# Patient Record
Sex: MALE | Race: WHITE | Employment: FULL TIME | URBAN - METROPOLITAN AREA
[De-identification: names, ages, dates, MRNs, and addresses within clinical notes are randomized per-mention and may not be internally consistent; named-entity substitution may affect disease eponyms.]

---

## 2018-05-23 ENCOUNTER — OFFICE VISIT (OUTPATIENT)
Dept: NEUROLOGY | Age: 58
End: 2018-05-23
Payer: COMMERCIAL

## 2018-05-23 VITALS
HEART RATE: 63 BPM | OXYGEN SATURATION: 96 % | HEIGHT: 71 IN | RESPIRATION RATE: 18 BRPM | DIASTOLIC BLOOD PRESSURE: 71 MMHG | SYSTOLIC BLOOD PRESSURE: 110 MMHG | BODY MASS INDEX: 25.2 KG/M2 | WEIGHT: 180 LBS

## 2018-05-23 DIAGNOSIS — R55 PRE-SYNCOPE: Primary | ICD-10-CM

## 2018-05-23 PROCEDURE — 99214 OFFICE O/P EST MOD 30 MIN: CPT | Performed by: NURSE PRACTITIONER

## 2020-02-06 ENCOUNTER — HOSPITAL ENCOUNTER (OUTPATIENT)
Age: 60
Discharge: HOME | End: 2020-02-08

## 2020-02-06 PROCEDURE — 88305 TISSUE EXAM BY PATHOLOGIST: CPT

## 2020-09-09 ENCOUNTER — HOSPITAL ENCOUNTER (EMERGENCY)
Age: 60
Discharge: HOME | End: 2020-09-09
Attending: EMERGENCY MEDICINE
Payer: COMMERCIAL

## 2020-09-09 VITALS
TEMPERATURE: 97.8 F | WEIGHT: 180 LBS | OXYGEN SATURATION: 98 % | SYSTOLIC BLOOD PRESSURE: 128 MMHG | HEART RATE: 83 BPM | DIASTOLIC BLOOD PRESSURE: 78 MMHG | HEIGHT: 71 IN | BODY MASS INDEX: 25.2 KG/M2 | RESPIRATION RATE: 18 BRPM

## 2020-09-09 PROCEDURE — 6360000002 HC RX W HCPCS: Performed by: EMERGENCY MEDICINE

## 2020-09-09 PROCEDURE — 90714 TD VACC NO PRESV 7 YRS+ IM: CPT | Performed by: EMERGENCY MEDICINE

## 2020-09-09 PROCEDURE — 90471 IMMUNIZATION ADMIN: CPT | Performed by: EMERGENCY MEDICINE

## 2020-09-09 PROCEDURE — G0381 LEV 2 HOSP TYPE B ED VISIT: HCPCS

## 2020-09-09 RX ORDER — AMOXICILLIN AND CLAVULANATE POTASSIUM 875; 125 MG/1; MG/1
1 TABLET, FILM COATED ORAL 2 TIMES DAILY
Qty: 20 TABLET | Refills: 0 | Status: SHIPPED | OUTPATIENT
Start: 2020-09-09 | End: 2020-09-19

## 2020-09-09 RX ORDER — TETANUS AND DIPHTHERIA TOXOIDS ADSORBED 2; 2 [LF]/.5ML; [LF]/.5ML
0.5 INJECTION INTRAMUSCULAR ONCE
Status: COMPLETED | OUTPATIENT
Start: 2020-09-09 | End: 2020-09-09

## 2020-09-09 RX ADMIN — TETANUS AND DIPHTHERIA TOXOIDS ADSORBED 0.5 ML: 2; 2 INJECTION INTRAMUSCULAR at 11:20

## 2020-09-09 ASSESSMENT — PAIN DESCRIPTION - ONSET: ONSET: SUDDEN

## 2020-09-09 ASSESSMENT — PAIN DESCRIPTION - PROGRESSION: CLINICAL_PROGRESSION: NOT CHANGED

## 2020-09-09 ASSESSMENT — PAIN SCALES - GENERAL: PAINLEVEL_OUTOF10: 5

## 2020-09-09 ASSESSMENT — PAIN DESCRIPTION - LOCATION: LOCATION: ELBOW;LEG

## 2020-09-09 ASSESSMENT — PAIN DESCRIPTION - DESCRIPTORS: DESCRIPTORS: ACHING;BURNING;SORE;TENDER

## 2020-09-09 ASSESSMENT — PAIN DESCRIPTION - PAIN TYPE: TYPE: ACUTE PAIN

## 2020-09-09 ASSESSMENT — PAIN DESCRIPTION - ORIENTATION: ORIENTATION: LEFT;RIGHT;LOWER

## 2020-09-09 ASSESSMENT — PAIN DESCRIPTION - FREQUENCY: FREQUENCY: CONTINUOUS

## 2020-09-09 NOTE — LETTER
[address] [place] [address] Emergency Department  [address] [address]  Phone: [phone]               September 9, 2020    Patient: Skeeter Olszewski   YOB: 1960   Date of Visit: 9/9/2020       To Whom It May Concern:    Skeeter Olszewski was seen and treated in our emergency department on 9/9/2020. He may return to work on 09/14/2020.       Sincerely,       Anila Rhodes MD         Signature:__________________________________

## 2020-09-09 NOTE — ED PROVIDER NOTES
Oxygen Saturation Interpretation: Normal      ---------------------------------------------------PHYSICAL EXAM--------------------------------------      Constitutional/General: Alert and oriented x3, well appearing, non toxic in NAD  Head: NC/AT  Eyes: PERRL, EOMI  Mouth: Oropharynx clear, handling secretions, no trismus  Neck: Supple, full ROM, no meningeal signs  Pulmonary: Lungs clear to auscultation bilaterally, no wheezes, rales, or rhonchi. Not in respiratory distress  Cardiovascular:  Regular rate and rhythm, no murmurs, gallops, or rubs. 2+ distal pulses  Abdomen: Soft, non tender, non distended,   Extremities: Moves all extremities x 4. Warm and well perfused  Skin: small bleeding puncture wound with abrasion on left elbow, 1.5 cm fernandez shallow open wound noted on right lower leg posteriorly  Neurologic: GCS 15,  Psych: Normal Affect      ------------------------------ ED COURSE/MEDICAL DECISION MAKING----------------------  Medications   diptheria-tetanus toxoids (DECAVAC) 2-2 LF/0.5ML injection 0.5 mL (0.5 mLs Intramuscular Given 9/9/20 1120)         Medical Decision Making:    Keep wound sites clean and covered. Follow up with PCP/ Kalpana Meng. Patient to return to work without restrictions on 9-. Patient's Medications   New Prescriptions    AMOXICILLIN-CLAVULANATE (AUGMENTIN) 875-125 MG PER TABLET    Take 1 tablet by mouth 2 times daily for 10 days   Previous Medications    ASPIRIN 81 MG TABLET    Take 81 mg by mouth daily.     ATORVASTATIN (LIPITOR) 80 MG TABLET    Take 1 tablet by mouth daily    CITALOPRAM (CELEXA) 40 MG TABLET    TAKE ONE TABLET BY MOUTH EVERY DAY    LEVETIRACETAM (KEPPRA) 1000 MG TABLET    TAKE ONE TABLET BY MOUTH TWO TIMES A DAY    METOPROLOL SUCCINATE (TOPROL XL) 25 MG EXTENDED RELEASE TABLET    Take 1 tablet by mouth daily    NITROSTAT 0.4 MG SL TABLET        OMEPRAZOLE (PRILOSEC) 20 MG DELAYED RELEASE CAPSULE    Take 1 capsule by mouth Daily   Modified Medications No medications on file   Discontinued Medications    CEPHALEXIN (KEFLEX) 500 MG CAPSULE    Take 1 capsule by mouth 3 times daily    VALACYCLOVIR (VALTREX) 1 G TABLET    2 po bid times one day         Counseling: The emergency provider has spoken with the patient and discussed todays results, in addition to providing specific details for the plan of care and counseling regarding the diagnosis and prognosis. Questions are answered at this time and they are agreeable with the plan.      --------------------------------- IMPRESSION AND DISPOSITION ---------------------------------    IMPRESSION  1. Dog bite of right lower leg, initial encounter    2.  Puncture wound of left elbow, initial encounter        DISPOSITION  Disposition: Discharge to home  Patient condition is good                  Mary Nunez MD  09/09/20 9420

## 2021-11-15 ENCOUNTER — HOSPITAL ENCOUNTER (OUTPATIENT)
Age: 61
Discharge: HOME | End: 2021-11-17

## 2021-11-15 PROCEDURE — 88305 TISSUE EXAM BY PATHOLOGIST: CPT

## 2022-10-14 ENCOUNTER — OFFICE VISIT (OUTPATIENT)
Dept: NEUROLOGY | Age: 62
End: 2022-10-14
Payer: COMMERCIAL

## 2022-10-14 VITALS
HEIGHT: 71 IN | WEIGHT: 180 LBS | BODY MASS INDEX: 25.2 KG/M2 | SYSTOLIC BLOOD PRESSURE: 102 MMHG | DIASTOLIC BLOOD PRESSURE: 70 MMHG

## 2022-10-14 DIAGNOSIS — G40.909 RECURRENT SEIZURES (HCC): Primary | ICD-10-CM

## 2022-10-14 DIAGNOSIS — G40.309 GENERALIZED SEIZURE DISORDER (HCC): Chronic | ICD-10-CM

## 2022-10-14 DIAGNOSIS — G40.009 PARTIAL IDIOPATHIC EPILEPSY WITH SEIZURES OF LOCALIZED ONSET, NOT INTRACTABLE, WITHOUT STATUS EPILEPTICUS (HCC): ICD-10-CM

## 2022-10-14 DIAGNOSIS — Z86.69 HX OF SEIZURE DISORDER: ICD-10-CM

## 2022-10-14 DIAGNOSIS — R42 DIZZINESS AND GIDDINESS: ICD-10-CM

## 2022-10-14 PROCEDURE — 99204 OFFICE O/P NEW MOD 45 MIN: CPT | Performed by: PSYCHIATRY & NEUROLOGY

## 2022-10-14 RX ORDER — ALLOPURINOL 100 MG/1
TABLET ORAL
COMMUNITY
Start: 2022-07-13

## 2022-10-14 RX ORDER — LEVETIRACETAM 250 MG/1
250 TABLET ORAL 2 TIMES DAILY
Qty: 60 TABLET | Refills: 3 | Status: SHIPPED | OUTPATIENT
Start: 2022-10-14

## 2022-10-14 ASSESSMENT — ENCOUNTER SYMPTOMS
ALLERGIC/IMMUNOLOGIC NEGATIVE: 1
GASTROINTESTINAL NEGATIVE: 1
EYES NEGATIVE: 1
RESPIRATORY NEGATIVE: 1

## 2022-10-14 NOTE — PROGRESS NOTES
Neurology Consult Note:    Patient: Hailey Garcia  : 1960  Date: 10/14/22  Referring provider: MARITA Villafana -*    Referral to Neurology: hx 'mixed' seizure disorder, possible focal seizures with secondary generalization. Cc: hx recurrent seizure 3 weeks ago. History of seizure disorder x30 years. 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 He was prescribed Keppra 1000 mg twice daily with recent negligible level indicating medication noncompliance. There is also history of presyncope. He seems to have difficulty understanding his seizure diagnosis and seizure types. He does have episodes of chronic dizziness. He reports his cardiologist thought he had a \"vagus nerve\" problem. Sometimes he retches and vomits repeatedly in association with a \"seizure\". Prior EEG in 2017 was wnl. From patient's point of view, he does not think his seizures were any different than the past but his wife reports it took him  longer to come back to baseline. His wife denies staring spell episodes. There is no history of tongue biting or incontinence. He denies warning symptoms prior to his seizure. Wife reports no staring spell episodes. +family hx seizure disorder in several relatives    Lab Data:reviewed from [facility] lab, no therapeutic level of Keppra was detected. Imaging Data: denies ever having had a brain scan. [facility] Neurology progress notes reviewed from [facility], 2018, 2018 and Dr. Otis Jones, 2014.    2017, EEG, Dr. Otis Jones:  This 59-year-old man on Prilosec, Ambien, Celexa, Lipitor, Keppra, Toprol and aspirin displayed following underlying rhythms---well-organized, 10 Hz, 30-40 µV alpha rhythms in both posterior regions. 18 Hz, 10 µV beta rhythms were noted in both precentral regions. There was symmetrical attenuation of posterior alpha rhythm with eye opening. Hyperventilation was not performed. There was fair driving to photic stimulation without abnormalities. The patient did not fall asleep during this tracing. Throughout this recording, there were no focal abnormalities or epileptiform activity. Impression---normal awake EEG    Current Outpatient Medications   Medication Sig Dispense Refill    allopurinol (ZYLOPRIM) 100 MG tablet TAKE ONE TABLET BY MOUTH EVERY DAY      atorvastatin (LIPITOR) 80 MG tablet Take 1 tablet by mouth daily 90 tablet 1    citalopram (CELEXA) 40 MG tablet TAKE ONE TABLET BY MOUTH EVERY DAY 90 tablet 1    levETIRAcetam (KEPPRA) 1000 MG tablet TAKE ONE TABLET BY MOUTH TWO TIMES A  tablet 1    metoprolol succinate (TOPROL XL) 25 MG extended release tablet Take 1 tablet by mouth daily 90 tablet 1    omeprazole (PRILOSEC) 20 MG delayed release capsule Take 1 capsule by mouth Daily 90 capsule 1    NITROSTAT 0.4 MG SL tablet       aspirin 81 MG tablet Take 81 mg by mouth daily. No current facility-administered medications for this visit.        Allergies   Allergen Reactions    Dye [Barium-Containing Compounds]        Patient Active Problem List   Diagnosis    Hyperlipidemia    Seizure disorder [facility])    Sleep apnea    Pacemaker    Gastroesophageal reflux disease    Depression    S/P coronary artery stent placement       Past Medical History:   Diagnosis Date    Chronic back pain     Depression     Hyperlipidemia     Pacemaker 12/14/2009    Prediabetes     Seizures ([facility] [address]     Sleep apnea        Past Surgical History:   Procedure Laterality Date    COLONOSCOPY      CORONARY ANGIOPLASTY WITH STENT PLACEMENT      PACEMAKER INSERTION      UPPER GASTROINTESTINAL ENDOSCOPY         Family History   Problem Relation Age of Onset    Cancer Mother     Heart Disease Father        Social History     Socioeconomic History    Marital status:      Spouse name: Not on file    Number of children: Not on file    Years of education: Not on file    Highest education level: Not on file   Occupational History    Not on file   Tobacco Use    Smoking status: Former     Packs/day: 0.25     Years: 10.00     Pack years: 2.50     Types: Cigarettes     Quit date: 1990     Years since quittin.9    Smokeless tobacco: Never   Vaping Use    Vaping Use: Never used   Substance and Sexual Activity    Alcohol use: Yes     Comment: occasionally    Drug use: No    Sexual activity: Not on file   Other Topics Concern    Not on file   Social History Narrative    Not on file     Social Determinants of Health     Financial Resource Strain: Not on file   Food Insecurity: Not on file   Transportation Needs: Not on file   Physical Activity: Not on file   Stress: Not on file   Social Connections: Not on file   Intimate Partner Violence: Not on file   Housing Stability: Not on file     Review of Systems   Constitutional: Negative. HENT: Negative. Eyes: Negative. Respiratory: Negative. Cardiovascular:         PM   Gastrointestinal: Negative. Endocrine: Negative. Genitourinary: Negative. Musculoskeletal: Negative. Skin: Negative. Allergic/Immunologic: Negative. Neurological:  Positive for dizziness and seizures. Hematological: Negative. Psychiatric/Behavioral:  Positive for agitation. The patient is nervous/anxious. All other systems reviewed and are negative.     Neurologic Exam:  /70 (Site: Right Upper Arm, Position: Sitting, Cuff Size: Medium Adult)   Ht 5' 11\" (1.803 m)   Wt 180 lb (81.6 kg)   BMI 25.10 kg/m²   General appearance: Alert, cooperative, anxious, mildly agitated, well-nourished, well-groomed, seated in the exam room, no acute distress. Wife accompanies him to today's appointment. HEENT: Normocephalic/atraumatic. Neck: Supple  Cardiac: RRR  Respiratory: grossly clear  Extremities: No edema, erythema or cyanosis  Skin: No lesions or rashes  Musculoskeletal: No fasciculations or tremors  Mental Status: Alert, oriented x3  Speech/Language: Clear, fluent  Attention span/Concentration: Grossly intact  Affect/Mood: Anxious, mildly agitated  Insight/Judgement: Sudha 89 of Knowledge/Current events: Unable to accurately assess, somewhat limited historian. History supplemented by wife, review of online records. CN II-XII:     Pupils: Equal, reactive to light, 2.0 mm     EOM's: Full without nystagmus  Visual Fields: Full to confrontation  Fundi: Miosis to light  CN V: normal V1-V3  CN VII: No facial droop  CN VIII: Hearing grossly intact  CN IX-XII: Tongue midline  SCM/Trapezii: 5/5 power  Motor: 5/5 power in the upper and lower extremities without tremor or drift and normal motor tone, intact fine motor function of both hands, symmetric  DTR's: 1+ and symmetric in the upper and lower extremities, no ankle clonus, plantar responses are flexor. Sensory: Grossly intact subjectively to light touch and sharp stick testing. Coordination/Gait: No gross limb dysmetria on finger-to-nose testing, no intention tremors, no gait ataxia. Assessment/Plan:  1. History of partial onset seizure disorder with secondary generalization described in review of past medical records. Patient's wife reports observation of generalized stiffening of the entire body sometimes accompanied by vocalization and more prolonged return to baseline cognitive state. Patient describes seizures are precipitated by \"pain, or fatigue\". 2.  He is treated with Keppra prophylaxis, 1000 mg twice daily, however, stated he was taking all of his medication only once daily with his other meds.   I have recommended an upward increase of Keppra to 1250 mg twice daily and explained that Keppra should be dosed about 12 hours apart. 3.  A noncontrasted brain CT scan is ordered, routine awake/sleep EEG, and additional labs including an a.m. trough Keppra level in 1 week as below. He will be informed accordingly of his test results once they become available by phone. 4.  He was recommended of no driving for minimum period of 6 months in the state of [place] and must remain seizure-free during that time in order to drive. 5.  He prefers to follow-up in the Neurology clinic in 6 months otherwise. 6.  Patient information was provided from the Epilepsy foundation website. He continues to have recurrent spells or seizures despite taking his medication properly and the upward titration of Keppra, then we discussed referral to a [address] epilepsy department, [facility] perhaps, where he may obtain a prolonged video-EEG study to better characterize the nature of his spells. (A prolonged video-EEG study cannot be obtained here locally at [facility]) facilities.)  Patient/wife expressed understanding of above discussion. Sincerely,      Casey Membreno MD    Neurology & Clinical Neurophysiology    This note was created using speech recognition transcription software. Despite proofreading, there may be several typographical errors present that may affect the meaning of the content. Please call with any questions. A total time of 40 mins. was spent on the date of service in preparation for this visit, which included face-to-face patient care, completing clinical documentation, counseling and coordination of care based on clinical impression, neurologic diagnosis, review of pertinent imaging studies, test results, implementation and discussion of treatment plan, risk factor reduction and patient and/or family education.     Orders Placed This Encounter   Procedures    CT HEAD WO CONTRAST     Standing Status:   Future     Standing Expiration Date:   12/14/2022 Order Specific Question:   Reason for exam:     Answer:   hx recurrent seizures or spells, hx chronic dizziness, has an older model PM and can't undergo an MRI & had dyd allergy     Order Specific Question:   Reason for exam:     Answer:   evaluate for infarct, mass, bleed    Levetiracetam Level     A.m. trough level before 1st dose of med.      Standing Status:   Future     Standing Expiration Date:   12/14/2022    CBC with Auto Differential     Standing Status:   Future     Standing Expiration Date:   10/14/2023    Comprehensive Metabolic Panel     Standing Status:   Future     Standing Expiration Date:   10/14/2023    Magnesium     Standing Status:   Future     Standing Expiration Date:   10/14/2023    EEG     Routine EEG awake/sleep     Standing Status:   Future     Standing Expiration Date:   12/14/2022     Scheduling Instructions:      Hx recurrent seizures      Hx syncope, dizziness

## 2022-10-19 ENCOUNTER — HOSPITAL ENCOUNTER (OUTPATIENT)
Dept: INTERVENTIONAL RADIOLOGY/VASCULAR | Age: 62
Discharge: HOME | End: 2022-10-21
Payer: COMMERCIAL

## 2022-10-19 ENCOUNTER — HOSPITAL ENCOUNTER (OUTPATIENT)
Dept: NEUROLOGY | Age: 62
Discharge: HOME | End: 2022-10-19
Payer: COMMERCIAL

## 2022-10-19 DIAGNOSIS — I25.10 ASHD (ARTERIOSCLEROTIC HEART DISEASE): ICD-10-CM

## 2022-10-19 DIAGNOSIS — R42 DIZZINESS AND GIDDINESS: ICD-10-CM

## 2022-10-19 DIAGNOSIS — G40.909 RECURRENT SEIZURES (HCC): ICD-10-CM

## 2022-10-19 DIAGNOSIS — Z86.69 HX OF SEIZURE DISORDER: ICD-10-CM

## 2022-10-19 PROCEDURE — 95819 EEG AWAKE AND ASLEEP: CPT

## 2022-10-19 PROCEDURE — 95812 EEG 41-60 MINUTES: CPT | Performed by: PSYCHIATRY & NEUROLOGY

## 2022-10-19 PROCEDURE — 93922 UPR/L XTREMITY ART 2 LEVELS: CPT

## 2022-10-19 NOTE — PROCEDURES
ELECTROENCEPHALOGRAM REPORT:     Kevon Diss  : 1960  Referring provider: Collin Ashby MD  Primary provider: Faustino Smith MD    Appointment Date 10/19/2022 Appointment Time 9:30 AM   Facility Location [facility] EEG Number [id number]   Type of Study EEG Floor OP     Technical Specifications  Technician [facility] of consciousness Awake and Asleep   Sleep deprived? No   Hyperventilation tested? No   Photic stim tested? Yes   EEG recording 19-channel EEG recorded using the [employer] digital EEG instrument and includes 1 channel for ECG monitoring. Scalp electrodes were applied using the international 10-20 placement protocol. Duration of recording 42:15 min   EEG complete? 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 There is also a history of presyncope and chronic dizziness. There is no history of tongue biting or incontinence. Medications:   Current Outpatient Medications   Medication Sig Dispense Refill    allopurinol (ZYLOPRIM) 100 MG tablet TAKE ONE TABLET BY MOUTH EVERY DAY      levETIRAcetam (KEPPRA) 250 MG tablet Take 1 tablet by mouth 2 times daily 60 tablet 3    atorvastatin (LIPITOR) 80 MG tablet Take 1 tablet by mouth daily 90 tablet 1    citalopram (CELEXA) 40 MG tablet TAKE ONE TABLET BY MOUTH EVERY DAY 90 tablet 1    levETIRAcetam (KEPPRA) 1000 MG tablet TAKE ONE TABLET BY MOUTH TWO TIMES A  tablet 1    metoprolol succinate (TOPROL XL) 25 MG extended release tablet Take 1 tablet by mouth daily 90 tablet 1    omeprazole (PRILOSEC) 20 MG delayed release capsule Take 1 capsule by mouth Daily 90 capsule 1    NITROSTAT 0.4 MG SL tablet       aspirin 81 MG tablet Take 81 mg by mouth daily.        No current facility-administered medications for this encounter. Tracing Description Results: 19-channel awake/drowsy EEG recording shows normal posterior dominant alpha rhythm activity of 8.5 Hz frequency of low to moderate amplitude, maximal over the posterior occipital regions, symmetrical and which attenuates with eye opening. There is excessive fast activity in the beta range of 22-25 Hz of low amplitude, probably related to psychotropic medication effect of citalopram.    Intermittent periods of drowsiness but no sleep is achieved. There is frequent eye blink, eye movement and EMG (I.e., muscle) artifact noted during the recording, which suggests an elevated anxiety-tension state or inability to relax fully during portions of the recording. No maximiliano epileptiform discharges or EEG seizure patterns are noted. Activating Procedures:  Hyperventilation: Not done  Photic Stimulation: Produces no photic driving    IMPRESSION: This 19-channel awake/drowsy EEG recording shows no maximiliano epileptiform discharges or EEG seizure patterns. There is excessive fast activity probably related to psychotropic medication effect of citalopram.    Clinical correlation is recommended.       Melvina Tobar M.D    Neurology & Clinical Neurophysiology

## 2022-10-20 ENCOUNTER — TELEPHONE (OUTPATIENT)
Dept: NEUROLOGY | Age: 62
End: 2022-10-20

## 2022-10-25 ENCOUNTER — HOSPITAL ENCOUNTER (OUTPATIENT)
Dept: CT IMAGING | Age: 62
Discharge: HOME | End: 2022-10-27
Payer: COMMERCIAL

## 2022-10-25 DIAGNOSIS — G40.909 RECURRENT SEIZURES (HCC): ICD-10-CM

## 2022-10-25 DIAGNOSIS — Z86.69 HX OF SEIZURE DISORDER: ICD-10-CM

## 2022-10-25 DIAGNOSIS — R42 DIZZINESS AND GIDDINESS: ICD-10-CM

## 2022-10-25 PROCEDURE — 70450 CT HEAD/BRAIN W/O DYE: CPT

## 2022-10-26 ENCOUNTER — TELEPHONE (OUTPATIENT)
Dept: NEUROLOGY | Age: 62
End: 2022-10-26

## 2022-10-26 NOTE — TELEPHONE ENCOUNTER
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 Medical mgmt of chronic medical conditions, vascular risk factors are advisable.

## 2025-01-29 ENCOUNTER — APPOINTMENT (OUTPATIENT)
Dept: GENERAL RADIOLOGY | Age: 65
DRG: 596 | End: 2025-01-29
Payer: COMMERCIAL

## 2025-01-29 ENCOUNTER — HOSPITAL ENCOUNTER (INPATIENT)
Age: 65
LOS: 1 days | Discharge: HOME | DRG: 596 | End: 2025-01-30
Attending: EMERGENCY MEDICINE | Admitting: INTERNAL MEDICINE
Payer: COMMERCIAL

## 2025-01-29 DIAGNOSIS — R06.02 SHORTNESS OF BREATH: Primary | ICD-10-CM

## 2025-01-29 DIAGNOSIS — R53.83 OTHER FATIGUE: ICD-10-CM

## 2025-01-29 DIAGNOSIS — R21 RASH AND OTHER NONSPECIFIC SKIN ERUPTION: ICD-10-CM

## 2025-01-29 PROBLEM — R78.81 BACTEREMIA: Status: ACTIVE | Noted: 2025-01-29

## 2025-01-29 LAB
ALBUMIN SERPL-MCNC: 4.2 G/DL (ref 3.5–5.2)
ALP SERPL-CCNC: 58 U/L (ref 40–129)
ALT SERPL-CCNC: 54 U/L (ref 0–40)
ANION GAP SERPL CALCULATED.3IONS-SCNC: 12 MMOL/L (ref 7–16)
AST SERPL-CCNC: 32 U/L (ref 0–39)
BASOPHILS # BLD: 0.03 K/UL (ref 0–0.2)
BASOPHILS NFR BLD: 1 % (ref 0–2)
BILIRUB SERPL-MCNC: 0.5 MG/DL (ref 0–1.2)
BNP SERPL-MCNC: 87 PG/ML (ref 0–125)
BUN SERPL-MCNC: 17 MG/DL (ref 6–23)
CALCIUM SERPL-MCNC: 9.1 MG/DL (ref 8.6–10.2)
CHLORIDE SERPL-SCNC: 99 MMOL/L (ref 98–107)
CK SERPL-CCNC: 105 U/L (ref 20–200)
CO2 SERPL-SCNC: 25 MMOL/L (ref 22–29)
CREAT SERPL-MCNC: 1.1 MG/DL (ref 0.7–1.2)
CRP SERPL HS-MCNC: 16 MG/L (ref 0–5)
EOSINOPHIL # BLD: 0.09 K/UL (ref 0.05–0.5)
EOSINOPHILS RELATIVE PERCENT: 2 % (ref 0–6)
ERYTHROCYTE [DISTWIDTH] IN BLOOD BY AUTOMATED COUNT: 13 % (ref 11.5–15)
ERYTHROCYTE [SEDIMENTATION RATE] IN BLOOD BY WESTERGREN METHOD: 11 MM/HR (ref 0–15)
GFR, ESTIMATED: 73 ML/MIN/1.73M2
GLUCOSE SERPL-MCNC: 109 MG/DL (ref 74–99)
HCT VFR BLD AUTO: 43.2 % (ref 37–54)
HGB BLD-MCNC: 15 G/DL (ref 12.5–16.5)
IMM GRANULOCYTES # BLD AUTO: <0.03 K/UL (ref 0–0.58)
IMM GRANULOCYTES NFR BLD: 0 % (ref 0–5)
INFLUENZA A BY PCR: NOT DETECTED
INFLUENZA B BY PCR: NOT DETECTED
LACTATE BLDV-SCNC: 1.1 MMOL/L (ref 0.5–1.9)
LYMPHOCYTES NFR BLD: 1.63 K/UL (ref 1.5–4)
LYMPHOCYTES RELATIVE PERCENT: 30 % (ref 20–42)
MCH RBC QN AUTO: 28.9 PG (ref 26–35)
MCHC RBC AUTO-ENTMCNC: 34.7 G/DL (ref 32–34.5)
MCV RBC AUTO: 83.2 FL (ref 80–99.9)
MONOCYTES NFR BLD: 0.88 K/UL (ref 0.1–0.95)
MONOCYTES NFR BLD: 16 % (ref 2–12)
NEUTROPHILS NFR BLD: 51 % (ref 43–80)
NEUTS SEG NFR BLD: 2.72 K/UL (ref 1.8–7.3)
PLATELET # BLD AUTO: 252 K/UL (ref 130–450)
PMV BLD AUTO: 8.6 FL (ref 7–12)
POTASSIUM SERPL-SCNC: 4.1 MMOL/L (ref 3.5–5)
PROT SERPL-MCNC: 7.8 G/DL (ref 6.4–8.3)
RBC # BLD AUTO: 5.19 M/UL (ref 3.8–5.8)
SARS-COV-2 RDRP RESP QL NAA+PROBE: NOT DETECTED
SODIUM SERPL-SCNC: 136 MMOL/L (ref 132–146)
SPECIMEN DESCRIPTION: NORMAL
TROPONIN I SERPL HS-MCNC: 7 NG/L (ref 0–11)
WBC OTHER # BLD: 5.4 K/UL (ref 4.5–11.5)

## 2025-01-29 PROCEDURE — 84484 ASSAY OF TROPONIN QUANT: CPT

## 2025-01-29 PROCEDURE — 85652 RBC SED RATE AUTOMATED: CPT

## 2025-01-29 PROCEDURE — 85025 COMPLETE CBC W/AUTO DIFF WBC: CPT

## 2025-01-29 PROCEDURE — 99285 EMERGENCY DEPT VISIT HI MDM: CPT

## 2025-01-29 PROCEDURE — 73130 X-RAY EXAM OF HAND: CPT

## 2025-01-29 PROCEDURE — 71045 X-RAY EXAM CHEST 1 VIEW: CPT

## 2025-01-29 PROCEDURE — 2580000003 HC RX 258: Performed by: EMERGENCY MEDICINE

## 2025-01-29 PROCEDURE — 2060000000 HC ICU INTERMEDIATE R&B

## 2025-01-29 PROCEDURE — 83880 ASSAY OF NATRIURETIC PEPTIDE: CPT

## 2025-01-29 PROCEDURE — 93005 ELECTROCARDIOGRAM TRACING: CPT | Performed by: PHYSICIAN ASSISTANT

## 2025-01-29 PROCEDURE — 86140 C-REACTIVE PROTEIN: CPT

## 2025-01-29 PROCEDURE — 83605 ASSAY OF LACTIC ACID: CPT

## 2025-01-29 PROCEDURE — 80053 COMPREHEN METABOLIC PANEL: CPT

## 2025-01-29 PROCEDURE — 82550 ASSAY OF CK (CPK): CPT

## 2025-01-29 PROCEDURE — 87635 SARS-COV-2 COVID-19 AMP PRB: CPT

## 2025-01-29 PROCEDURE — 6370000000 HC RX 637 (ALT 250 FOR IP): Performed by: NURSE PRACTITIONER

## 2025-01-29 PROCEDURE — 87040 BLOOD CULTURE FOR BACTERIA: CPT

## 2025-01-29 PROCEDURE — 87502 INFLUENZA DNA AMP PROBE: CPT

## 2025-01-29 RX ORDER — 0.9 % SODIUM CHLORIDE 0.9 %
1000 INTRAVENOUS SOLUTION INTRAVENOUS ONCE
Status: COMPLETED | OUTPATIENT
Start: 2025-01-29 | End: 2025-01-29

## 2025-01-29 RX ORDER — ONDANSETRON 2 MG/ML
4 INJECTION INTRAMUSCULAR; INTRAVENOUS EVERY 6 HOURS PRN
Status: DISCONTINUED | OUTPATIENT
Start: 2025-01-29 | End: 2025-01-30 | Stop reason: HOSPADM

## 2025-01-29 RX ORDER — ATORVASTATIN CALCIUM 40 MG/1
80 TABLET, FILM COATED ORAL DAILY
Status: DISCONTINUED | OUTPATIENT
Start: 2025-01-30 | End: 2025-01-30 | Stop reason: HOSPADM

## 2025-01-29 RX ORDER — POTASSIUM CHLORIDE 1500 MG/1
40 TABLET, EXTENDED RELEASE ORAL PRN
Status: DISCONTINUED | OUTPATIENT
Start: 2025-01-29 | End: 2025-01-30 | Stop reason: HOSPADM

## 2025-01-29 RX ORDER — ACETAMINOPHEN 650 MG/1
650 SUPPOSITORY RECTAL EVERY 6 HOURS PRN
Status: DISCONTINUED | OUTPATIENT
Start: 2025-01-29 | End: 2025-01-30 | Stop reason: HOSPADM

## 2025-01-29 RX ORDER — PRIMIDONE 50 MG/1
50 TABLET ORAL 2 TIMES DAILY
Status: DISCONTINUED | OUTPATIENT
Start: 2025-01-30 | End: 2025-01-30 | Stop reason: HOSPADM

## 2025-01-29 RX ORDER — ENOXAPARIN SODIUM 100 MG/ML
40 INJECTION SUBCUTANEOUS DAILY
Status: DISCONTINUED | OUTPATIENT
Start: 2025-01-30 | End: 2025-01-30 | Stop reason: HOSPADM

## 2025-01-29 RX ORDER — LEVETIRACETAM 500 MG/1
1000 TABLET ORAL 2 TIMES DAILY
Status: DISCONTINUED | OUTPATIENT
Start: 2025-01-29 | End: 2025-01-30 | Stop reason: HOSPADM

## 2025-01-29 RX ORDER — SODIUM CHLORIDE 9 MG/ML
INJECTION, SOLUTION INTRAVENOUS PRN
Status: DISCONTINUED | OUTPATIENT
Start: 2025-01-29 | End: 2025-01-30 | Stop reason: HOSPADM

## 2025-01-29 RX ORDER — MAGNESIUM SULFATE IN WATER 40 MG/ML
2000 INJECTION, SOLUTION INTRAVENOUS PRN
Status: DISCONTINUED | OUTPATIENT
Start: 2025-01-29 | End: 2025-01-30 | Stop reason: HOSPADM

## 2025-01-29 RX ORDER — POTASSIUM CHLORIDE 7.45 MG/ML
10 INJECTION INTRAVENOUS PRN
Status: DISCONTINUED | OUTPATIENT
Start: 2025-01-29 | End: 2025-01-30 | Stop reason: HOSPADM

## 2025-01-29 RX ORDER — SODIUM CHLORIDE 0.9 % (FLUSH) 0.9 %
10 SYRINGE (ML) INJECTION EVERY 12 HOURS SCHEDULED
Status: DISCONTINUED | OUTPATIENT
Start: 2025-01-29 | End: 2025-01-30 | Stop reason: HOSPADM

## 2025-01-29 RX ORDER — ACETAMINOPHEN 325 MG/1
650 TABLET ORAL EVERY 6 HOURS PRN
Status: DISCONTINUED | OUTPATIENT
Start: 2025-01-29 | End: 2025-01-30 | Stop reason: HOSPADM

## 2025-01-29 RX ORDER — ONDANSETRON 4 MG/1
4 TABLET, ORALLY DISINTEGRATING ORAL EVERY 8 HOURS PRN
Status: DISCONTINUED | OUTPATIENT
Start: 2025-01-29 | End: 2025-01-30 | Stop reason: HOSPADM

## 2025-01-29 RX ORDER — ASPIRIN 81 MG/1
81 TABLET, CHEWABLE ORAL DAILY
Status: DISCONTINUED | OUTPATIENT
Start: 2025-01-30 | End: 2025-01-30 | Stop reason: HOSPADM

## 2025-01-29 RX ORDER — SENNOSIDES A AND B 8.6 MG/1
1 TABLET, FILM COATED ORAL DAILY PRN
Status: DISCONTINUED | OUTPATIENT
Start: 2025-01-29 | End: 2025-01-30 | Stop reason: HOSPADM

## 2025-01-29 RX ORDER — PANTOPRAZOLE SODIUM 40 MG/1
40 TABLET, DELAYED RELEASE ORAL
Status: DISCONTINUED | OUTPATIENT
Start: 2025-01-30 | End: 2025-01-30 | Stop reason: HOSPADM

## 2025-01-29 RX ORDER — CITALOPRAM HYDROBROMIDE 20 MG/1
40 TABLET ORAL DAILY
Status: DISCONTINUED | OUTPATIENT
Start: 2025-01-30 | End: 2025-01-30 | Stop reason: HOSPADM

## 2025-01-29 RX ORDER — SODIUM CHLORIDE 0.9 % (FLUSH) 0.9 %
10 SYRINGE (ML) INJECTION PRN
Status: DISCONTINUED | OUTPATIENT
Start: 2025-01-29 | End: 2025-01-30 | Stop reason: HOSPADM

## 2025-01-29 RX ORDER — ALLOPURINOL 100 MG/1
100 TABLET ORAL DAILY
Status: DISCONTINUED | OUTPATIENT
Start: 2025-01-30 | End: 2025-01-30 | Stop reason: HOSPADM

## 2025-01-29 RX ORDER — METOPROLOL SUCCINATE 25 MG/1
25 TABLET, EXTENDED RELEASE ORAL DAILY
Status: DISCONTINUED | OUTPATIENT
Start: 2025-01-30 | End: 2025-01-30 | Stop reason: HOSPADM

## 2025-01-29 RX ADMIN — LEVETIRACETAM 1000 MG: 500 TABLET, FILM COATED ORAL at 23:56

## 2025-01-29 RX ADMIN — SODIUM CHLORIDE 1000 ML: 9 INJECTION, SOLUTION INTRAVENOUS at 18:53

## 2025-01-29 ASSESSMENT — PAIN - FUNCTIONAL ASSESSMENT: PAIN_FUNCTIONAL_ASSESSMENT: NONE - DENIES PAIN

## 2025-01-29 ASSESSMENT — LIFESTYLE VARIABLES
HOW OFTEN DO YOU HAVE A DRINK CONTAINING ALCOHOL: NEVER
HOW MANY STANDARD DRINKS CONTAINING ALCOHOL DO YOU HAVE ON A TYPICAL DAY: PATIENT DOES NOT DRINK

## 2025-01-29 NOTE — ED TRIAGE NOTES
Department of Emergency Medicine    FIRST PROVIDER TRIAGE NOTE             Independent MLP           1/29/25  12:49 PM EST    Date of Encounter: 1/29/25   MRN: [id number]    Vitals:    01/29/25 1250   BP: 131/78   Pulse: 84   Resp: 14   Temp: 99.3 °F (37.4 °C)   TempSrc: Oral   SpO2: 94%   Weight: 82.1 kg (181 lb)   Height: 1.816 m (5' 11.5\")      HPI: Chandra Holley is a 64 y.o. male who presents to the ED for Wound Check (Wound to patient left palm x1 week. Sent over by  to check for infection or endocarditis, Pt is type 2 diabetic. ) and Fever (100-100.1 F over last week. Also reports body aches and fatigue )     Sent in by PCP to check for endocarditis  Pt has pacemaker   He is denying chest pain or SOB     Pt also reports body aches     ROS: Negative for cp or sob.    Physical Exam:   Gen Appearance/Constitutional: alert  CV: regular rate     Initial Plan of Care: All treatment areas with department are currently occupied.     Plan to order/Initiate the following while awaiting opening in ED: Triage evaluation  labs and imaging studies.    Provider-Patient relationship only established for Provider In Triage (PIT).  Full assessment, HPI, and examination not performed, therefore, it is not yet possible to state whether or not an emergency medical condition exists.  This provider not responsible to follow or interpret any labs or testing ordered in triage.  Supervisor request for CANDELARIO to initiate contact and input an assessment note in triage during high volume surges.     Initial Plan of Care: Initiate Treatment-Testing, Proceed toTreatment Area When Bed Available for ED Attending/MLP to Continue Care  Secondary to high volume, low staffing, and/or boarding- patient to await bed availability.    This ends my PIT-Patient relationship.  Care of patient relinquished after triage    Electronically signed by Arelis Parkinson PA-C   DD: 1/29/25

## 2025-01-29 NOTE — ED PROVIDER NOTES
Faxed ASHWIN today at 5:00 PM to Buddy Mcpherson at [phone].      Mariana Collins, PhD  Behavioral Health Fellow     within normal range or not returned as of this dictation.      RADIOLOGY:   Radiology studies interpreted by the radiologist unless otherwise specified.      XR CHEST 1 VIEW   Final Result   No acute process.         XR HAND LEFT (MIN 3 VIEWS)   Final Result   No acute osseous abnormality.           XR HAND LEFT (MIN 3 VIEWS)    Result Date: 1/29/2025  EXAMINATION: THREE XRAY VIEWS OF THE LEFT HAND 1/29/2025 1:31 pm COMPARISON: None. HISTORY: ORDERING SYSTEM PROVIDED HISTORY: wound TECHNOLOGIST PROVIDED HISTORY: Reason for exam:->wound FINDINGS: There is no evidence of acute fracture.  There is normal alignment.  No acute joint abnormality.  No focal osseous lesion. No focal soft tissue abnormality.  Minimal degenerative changes of the 1st metacarpophalangeal joint.  Slight soft tissue swelling of the distal 4th digit.  No soft tissue gas.  No bone destruction.     No acute osseous abnormality.       No results found.    PROCEDURES   Unless otherwise noted below, none       PAST MEDICAL HISTORY/Chronic Conditions Affecting Care      has a past medical history of Chronic back pain, Depression, Generalized seizure disorder (HCC) (10/14/2022), Hyperlipidemia, Pacemaker (12/14/2009), Prediabetes, Seizures ([facility]), and Sleep apnea.     EMERGENCY DEPARTMENT COURSE    Vitals:    Vitals:    01/29/25 1250 01/29/25 1928 01/29/25 2352   BP: 131/78 (!) 149/80 135/74   Pulse: 84 71 80   Resp: 14 16 14   Temp: 99.3 °F (37.4 °C) 98.7 °F (37.1 °C) 98.9 °F (37.2 °C)   TempSrc: Oral Oral    SpO2: 94% 99% 99%   Weight: 82.1 kg (181 lb)     Height: 1.816 m (5' 11.5\")         Patient was given the following medications:  Medications   allopurinol (ZYLOPRIM) tablet 100 mg (has no administration in time range)   aspirin chewable tablet 81 mg (has no administration in time range)   atorvastatin (LIPITOR) tablet 80 mg (has no administration in time range)   citalopram (CELEXA) tablet 40 mg (has no administration in time range)   levETIRAcetam  30-Oct-2024 15:15

## 2025-01-30 VITALS
OXYGEN SATURATION: 96 % | TEMPERATURE: 98.9 F | DIASTOLIC BLOOD PRESSURE: 55 MMHG | HEART RATE: 76 BPM | BODY MASS INDEX: 24.52 KG/M2 | WEIGHT: 181 LBS | SYSTOLIC BLOOD PRESSURE: 118 MMHG | RESPIRATION RATE: 20 BRPM | HEIGHT: 72 IN

## 2025-01-30 LAB
ANION GAP SERPL CALCULATED.3IONS-SCNC: 12 MMOL/L (ref 7–16)
BASOPHILS # BLD: 0.16 K/UL (ref 0–0.2)
BASOPHILS NFR BLD: 3 % (ref 0–2)
BUN SERPL-MCNC: 17 MG/DL (ref 6–23)
CALCIUM SERPL-MCNC: 8.8 MG/DL (ref 8.6–10.2)
CHLORIDE SERPL-SCNC: 101 MMOL/L (ref 98–107)
CO2 SERPL-SCNC: 23 MMOL/L (ref 22–29)
CREAT SERPL-MCNC: 1.2 MG/DL (ref 0.7–1.2)
EKG ATRIAL RATE: 74 BPM
EKG P AXIS: 56 DEGREES
EKG P-R INTERVAL: 152 MS
EKG Q-T INTERVAL: 384 MS
EKG QRS DURATION: 84 MS
EKG QTC CALCULATION (BAZETT): 426 MS
EKG R AXIS: 23 DEGREES
EKG T AXIS: 55 DEGREES
EKG VENTRICULAR RATE: 74 BPM
EOSINOPHIL # BLD: 0.05 K/UL (ref 0.05–0.5)
EOSINOPHILS RELATIVE PERCENT: 1 % (ref 0–6)
ERYTHROCYTE [DISTWIDTH] IN BLOOD BY AUTOMATED COUNT: 12.9 % (ref 11.5–15)
GFR, ESTIMATED: 70 ML/MIN/1.73M2
GLUCOSE SERPL-MCNC: 144 MG/DL (ref 74–99)
HCT VFR BLD AUTO: 40.7 % (ref 37–54)
HGB BLD-MCNC: 13.7 G/DL (ref 12.5–16.5)
LYMPHOCYTES NFR BLD: 1.67 K/UL (ref 1.5–4)
LYMPHOCYTES RELATIVE PERCENT: 27 % (ref 20–42)
MCH RBC QN AUTO: 28.2 PG (ref 26–35)
MCHC RBC AUTO-ENTMCNC: 33.7 G/DL (ref 32–34.5)
MCV RBC AUTO: 83.9 FL (ref 80–99.9)
MONOCYTES NFR BLD: 0.43 K/UL (ref 0.1–0.95)
MONOCYTES NFR BLD: 7 % (ref 2–12)
NEUTROPHILS NFR BLD: 63 % (ref 43–80)
NEUTS SEG NFR BLD: 3.88 K/UL (ref 1.8–7.3)
PLATELET # BLD AUTO: 240 K/UL (ref 130–450)
PMV BLD AUTO: 8.6 FL (ref 7–12)
POTASSIUM SERPL-SCNC: 3.9 MMOL/L (ref 3.5–5)
RBC # BLD AUTO: 4.85 M/UL (ref 3.8–5.8)
RBC # BLD: NORMAL 10*6/UL
SODIUM SERPL-SCNC: 136 MMOL/L (ref 132–146)
WBC OTHER # BLD: 6.2 K/UL (ref 4.5–11.5)

## 2025-01-30 PROCEDURE — 2500000003 HC RX 250 WO HCPCS: Performed by: NURSE PRACTITIONER

## 2025-01-30 PROCEDURE — 6370000000 HC RX 637 (ALT 250 FOR IP): Performed by: NURSE PRACTITIONER

## 2025-01-30 PROCEDURE — 6360000002 HC RX W HCPCS: Performed by: NURSE PRACTITIONER

## 2025-01-30 PROCEDURE — 6370000000 HC RX 637 (ALT 250 FOR IP): Performed by: SPECIALIST

## 2025-01-30 PROCEDURE — 80048 BASIC METABOLIC PNL TOTAL CA: CPT

## 2025-01-30 PROCEDURE — 93010 ELECTROCARDIOGRAM REPORT: CPT | Performed by: INTERNAL MEDICINE

## 2025-01-30 PROCEDURE — 85025 COMPLETE CBC W/AUTO DIFF WBC: CPT

## 2025-01-30 RX ORDER — VALACYCLOVIR HYDROCHLORIDE 1 G/1
1000 TABLET, FILM COATED ORAL 3 TIMES DAILY
Qty: 30 TABLET | Refills: 0 | Status: SHIPPED | OUTPATIENT
Start: 2025-01-30 | End: 2025-02-09

## 2025-01-30 RX ORDER — VALACYCLOVIR HYDROCHLORIDE 500 MG/1
1000 TABLET, FILM COATED ORAL 3 TIMES DAILY
Status: DISCONTINUED | OUTPATIENT
Start: 2025-01-30 | End: 2025-01-30 | Stop reason: HOSPADM

## 2025-01-30 RX ORDER — DIPHENHYDRAMINE HCL 25 MG
25 TABLET ORAL EVERY 6 HOURS PRN
Status: DISCONTINUED | OUTPATIENT
Start: 2025-01-30 | End: 2025-01-30 | Stop reason: HOSPADM

## 2025-01-30 RX ADMIN — SODIUM CHLORIDE, PRESERVATIVE FREE 10 ML: 5 INJECTION INTRAVENOUS at 08:29

## 2025-01-30 RX ADMIN — PANTOPRAZOLE SODIUM 40 MG: 40 TABLET, DELAYED RELEASE ORAL at 06:07

## 2025-01-30 RX ADMIN — DIPHENHYDRAMINE HCL 25 MG: 25 TABLET ORAL at 08:27

## 2025-01-30 RX ADMIN — SODIUM CHLORIDE, PRESERVATIVE FREE 10 ML: 5 INJECTION INTRAVENOUS at 01:36

## 2025-01-30 RX ADMIN — PRIMIDONE 50 MG: 50 TABLET ORAL at 08:27

## 2025-01-30 RX ADMIN — METOPROLOL SUCCINATE 25 MG: 25 TABLET, FILM COATED, EXTENDED RELEASE ORAL at 08:27

## 2025-01-30 RX ADMIN — Medication 3 MG: at 01:36

## 2025-01-30 RX ADMIN — VALACYCLOVIR 1000 MG: 500 TABLET, FILM COATED ORAL at 14:29

## 2025-01-30 RX ADMIN — LEVETIRACETAM 1000 MG: 500 TABLET, FILM COATED ORAL at 08:27

## 2025-01-30 RX ADMIN — CITALOPRAM HYDROBROMIDE 40 MG: 20 TABLET ORAL at 08:27

## 2025-01-30 RX ADMIN — ATORVASTATIN CALCIUM 80 MG: 40 TABLET, FILM COATED ORAL at 08:27

## 2025-01-30 RX ADMIN — ASPIRIN 81 MG CHEWABLE TABLET 81 MG: 81 TABLET CHEWABLE at 08:27

## 2025-01-30 RX ADMIN — ALLOPURINOL 100 MG: 100 TABLET ORAL at 08:27

## 2025-01-30 NOTE — ED NOTES
ED to Inpatient Handoff Report    Notified Stephanie Rn that electronic handoff available and patient ready for transport to room 417.    Safety Risks: None identified    Patient in Restraints: no    Constant Observer or Patient : no    Telemetry Monitoring Ordered: Yes          Order to transfer to unit without monitor: NO    Last MEWS: 0 Time completed: 2352    Deterioration Index: 20.43    Vitals:    01/29/25 1250 01/29/25 1928 01/29/25 2352   BP: 131/78 (!) 149/80 135/74   Pulse: 84 71 80   Resp: 14 16 14   Temp: 99.3 °F (37.4 °C) 98.7 °F (37.1 °C) 98.9 °F (37.2 °C)   TempSrc: Oral Oral    SpO2: 94% 99% 99%   Weight: 82.1 kg (181 lb)     Height: 1.816 m (5' 11.5\")         Opportunity for questions and clarification was provided.

## 2025-01-30 NOTE — CARE COORDINATION
Internal Medicine On-call Care Coordination Note    I was called by the ED physician because they recommended admission for this patient and we cover their PCP.  The history as I understand it after discussion with the ED physician is as follows:    Presents with fatigue, SOB, low grade fevers for days  Normally healthy  Developed hemorrhagic blister on finger, spread up arm, some calcified granules  Hx pacemaker  No recent travel or exposures  Concern for endocarditis     I placed admission orders.  Including:    General admission orders  Reconciled home meds  ID consult    Dr. Nicholson, or our coverage will see the patient tomorrow for H&P.    Electronically signed by MARITA Cosme CNP on 1/29/2025 at 9:35 PM

## 2025-01-30 NOTE — CARE COORDINATION
Introduced my self and provided explanation of CM role to patient.  Patient is awake, alert, and aware of current diagnosis and treatment plan including ID consult.  He voices he resides at home with his spouse and completes his adl's with independence.  He voices he is retired, still drives, uses no assistive devices.  Patient is established with a pcp and denies any issue with retail pharmaceutical coverage.  Patient will need followed and assisted with discharge planning as necessary.   Await ID consult.  Shreyas Mario, MSN RN  [facility] Case Management  [phone]

## 2025-01-30 NOTE — PLAN OF CARE
Problem: Discharge Planning  Goal: Discharge to home or other facility with appropriate resources  Outcome: Progressing     Problem: ABCDS Injury Assessment  Goal: Absence of physical injury  Outcome: Progressing     Problem: Skin/Tissue Integrity  Goal: Skin integrity remains intact  Description: 1.  Monitor for areas of redness and/or skin breakdown  2.  Assess vascular access sites hourly  3.  Every 4-6 hours minimum:  Change oxygen saturation probe site  4.  Every 4-6 hours:  If on nasal continuous positive airway pressure, respiratory therapy assess nares and determine need for appliance change or resting period  Outcome: Progressing

## 2025-01-30 NOTE — H&P
Internal Medicine History & Physical     Name: Chandra Holley  : 1960  Chief Complaint: Wound Check (Wound to patient left palm x1 week. Sent over by  to check for infection or endocarditis, Pt is type 2 diabetic. ) and Fever (100-100.1 F over last week. Also reports body aches and fatigue )  Primary Care Physician: Ale Diaz MD  Admission date: 2025  Date of service: 2025     History of Present Illness  Chandra is a 64 y.o. year old male.  Patient had developed a rash on his left hand with pain and discomfort starting about a week ago.  Was not resolving on its own.  No fevers, chills last couple days.  Was having temperatures up to 100 200.1 at home.  States that he was sent over by his doctor because of worsening symptoms.  He has been eating and drinking okay.  No diarrhea, loose stools, bloody stools.  He is breathing well.  No changes in vision or hearing.  No nausea, vomiting, diarrhea.  Patient has had bodyaches all over with fatigue for the last week as well.  No sick contacts.  Last traveled to [place] in September.  Patient states he has no other dysuria, hematuria, hemoptysis, skin rash or lesion.  Patient's wife is at bedside.  No other questions.  No pain in the chest.  No shortness of breath.      ED course:   Initial blood work and imaging studies performed. Admission recommended by ED physician. Case was discussed with ED provider. Meds in ED consisted of the following:  Medications   allopurinol (ZYLOPRIM) tablet 100 mg (has no administration in time range)   aspirin chewable tablet 81 mg (has no administration in time range)   atorvastatin (LIPITOR) tablet 80 mg (has no administration in time range)   citalopram (CELEXA) tablet 40 mg (has no administration in time range)   levETIRAcetam (KEPPRA) tablet 1,000 mg (1,000 mg Oral Given 25 9098)   metoprolol succinate (TOPROL XL) extended release tablet 25 mg (has no administration in time range)   pantoprazole

## 2025-01-30 NOTE — PLAN OF CARE
Problem: Discharge Planning  Goal: Discharge to home or other facility with appropriate resources  1/30/2025 0905 by Coretta Negron RN  Outcome: Progressing  1/30/2025 0229 by Stephanie Medel RN  Outcome: Progressing     Problem: ABCDS Injury Assessment  Goal: Absence of physical injury  1/30/2025 0905 by Coretta Negron RN  Outcome: Progressing  1/30/2025 0229 by Stephanie Medel RN  Outcome: Progressing     Problem: Skin/Tissue Integrity  Goal: Skin integrity remains intact  1/30/2025 0905 by Coretta Negron RN  Outcome: Progressing  1/30/2025 0229 by Stephanie Medel RN  Outcome: Progressing

## 2025-01-30 NOTE — PROGRESS NOTES
4 Eyes Skin Assessment     NAME:  Chandra Holley  YOB: 1960  MEDICAL RECORD NUMBER:  [id number]    The patient is being assessed for  Admission    I agree that at least one RN has performed a thorough Head to Toe Skin Assessment on the patient. ALL assessment sites listed below have been assessed.      Areas assessed by both nurses:    Head, Face, Ears, Shoulders, Back, Chest, Arms, Elbows, Hands, Sacrum. Buttock, Coccyx, Ischium, Legs. Feet and Heels, and Under Medical Devices         Does the Patient have a Wound? Yes wound(s) were present on assessment. LDA wound assessment was Initiated and completed by RN       Brennen Prevention initiated by RN: No  Wound Care Orders initiated by RN: No    Pressure Injury (Stage 3,4, Unstageable, DTI, NWPT, and Complex wounds) if present, place Wound referral order by RN under : No    New Ostomies, if present place, Ostomy referral order under : No     Nurse 1 eSignature: Electronically signed by Stephanie Medel RN on 1/30/25 at 2:32 AM EST    **SHARE this note so that the co-signing nurse can place an eSignature**    Nurse 2 eSignature: Electronically signed by Laura Neal RN on 1/30/25 at 5:52 AM EST

## 2025-01-30 NOTE — PLAN OF CARE
Problem: Discharge Planning  Goal: Discharge to home or other facility with appropriate resources  1/30/2025 1645 by Natalie Romero RN  Outcome: Completed  1/30/2025 0905 by Coretta Negron RN  Outcome: Progressing     Problem: ABCDS Injury Assessment  Goal: Absence of physical injury  1/30/2025 1645 by Natalie Romero RN  Outcome: Completed  1/30/2025 0905 by Coretta Negron RN  Outcome: Progressing     Problem: Skin/Tissue Integrity  Goal: Skin integrity remains intact  1/30/2025 1645 by Natalie Romero RN  Outcome: Completed  1/30/2025 0905 by Coretta Negron RN  Outcome: Progressing     Problem: Safety - Adult  Goal: Free from fall injury  Outcome: Completed

## 2025-01-30 NOTE — CONSULTS
melatonin    Allergies:  Dye [barium-containing compounds]    Social History:   Social History     Socioeconomic History    Marital status:      Spouse name: None    Number of children: None    Years of education: None    Highest education level: None   Tobacco Use    Smoking status: Former     Current packs/day: 0.00     Average packs/day: 0.3 packs/day for 10.0 years (2.5 ttl pk-yrs)     Types: Cigarettes     Start date: 1980     Quit date: 1990     Years since quittin.2    Smokeless tobacco: Never   Vaping Use    Vaping status: Never Used   Substance and Sexual Activity    Alcohol use: Yes     Comment: occasionally    Drug use: No     Social Determinants of Health     Food Insecurity: No Food Insecurity (2025)    Hunger Vital Sign     Worried About Running Out of Food in the Last Year: Never true     Ran Out of Food in the Last Year: Never true   Transportation Needs: No Transportation Needs (2025)    PRAPARE - Transportation     Lack of Transportation (Medical): No     Lack of Transportation (Non-Medical): No   Housing Stability: Low Risk  (2025)    Housing Stability Vital Sign     Unable to Pay for Housing in the Last Year: No     Number of Times Moved in the Last Year: 0     Homeless in the Last Year: No      Pets: None  Travel: [place] in 2024  The patient lives at home with his wife.  He is retired from the post office service    Family History:       Problem Relation Age of Onset    Cancer Mother     Heart Disease Father    . Otherwise non-pertinent to the chief complaint.    REVIEW OF SYSTEMS:    Constitutional: Negative for fevers, chills, diaphoresis  Neurologic: Negative   Psychiatric: Negative  Rheumatologic: Negative   Endocrine: Negative  Hematologic: Negative  Immunologic: The patient has declined most vaccinations he has been offered  ENT: Negative  Respiratory: Negative   Cardiovascular: Negative  GI: Negative  : Negative  Musculoskeletal:

## 2025-01-30 NOTE — DISCHARGE SUMMARY
Patient discharged within 24 hours of admission.  See H&P for further details.       Medication List        START taking these medications      valACYclovir 1 g tablet  Commonly known as: VALTREX  Take 1 tablet by mouth 3 times daily for 10 days            CHANGE how you take these medications      levETIRAcetam 1000 MG tablet  Commonly known as: KEPPRA  TAKE ONE TABLET BY MOUTH TWO TIMES A DAY  What changed: Another medication with the same name was removed. Continue taking this medication, and follow the directions you see here.            CONTINUE taking these medications      allopurinol 100 MG tablet  Commonly known as: ZYLOPRIM     aspirin 81 MG tablet     atorvastatin 80 MG tablet  Commonly known as: LIPITOR  Take 1 tablet by mouth daily     citalopram 40 MG tablet  Commonly known as: CELEXA  TAKE ONE TABLET BY MOUTH EVERY DAY     metoprolol succinate 25 MG extended release tablet  Commonly known as: TOPROL XL  Take 1 tablet by mouth daily     primidone 50 MG tablet  Commonly known as: MYSOLINE  TAKE ONE TABLET BY MOUTH IN THE MORNING AND AT BEDTIME            STOP taking these medications      Nitrostat 0.4 MG SL tablet  Generic drug: nitroGLYCERIN     omeprazole 20 MG delayed release capsule  Commonly known as: PRILOSEC               Where to Get Your Medications        These medications were sent to [address] -  [phone] - F [phone]  [address] [place] [address]      Phone: [phone]   valACYclovir 1 g tablet       Electronically signed by Oscar Ordonez MD on 1/30/2025 at 4:31 PM

## 2025-01-31 NOTE — PROGRESS NOTES
CLINICAL PHARMACY NOTE: MEDS TO BEDS    Total # of Prescriptions Filled: 1   The following medications were delivered to the patient:  Valtex 1 gm    Additional Documentation:     Rashaad

## 2025-01-31 NOTE — PROGRESS NOTES
Spiritual Health History and Assessment/Progress Note  [facility]     Encounter, Rituals, Rites and Sacraments,  ,  ,      Name: Chandra Holley MRN: [id number]    Age: 64 y.o.     Sex: male   Language: English   Mormon: Gnosticist   Bacteremia     Date: 1/30/2025                           Spiritual Assessment began in [address]        Referral/Consult From: Rounding   Encounter Overview/Reason:  Encounter, Rituals, Rites and Sacraments  Service Provided For: Patient    Kiara, Belief, Meaning:   Patient is connected with a kiara tradition or spiritual practice  Family/Friends are connected with a kiara tradition or spiritual practice      Importance and Influence:  Patient has no beliefs influential to healthcare decision-making identified during this visit  Family/Friends have no beliefs influential to healthcare decision-making identified during this visit    Community:  Patient feels well-supported. Support system includes: Spouse/Partner  Family/Friends feel well-supported. Support system includes: Extended family    Assessment and Plan of Care:     Patient Interventions include: Affirmed coping skills/support systems and Other: Offered a general prayer for recovery  Family/Friends Interventions include: Affirmed coping skills/support systems    Patient Plan of Care: Spiritual Care available upon further referral  Family/Friends Plan of Care: Spiritual Care available upon further referral    Electronically signed by Chaplain Leeann on 1/30/2025 at 7:45 PM

## 2025-02-02 LAB
MICROORGANISM SPEC CULT: NORMAL
MICROORGANISM SPEC CULT: NORMAL
SERVICE CMNT-IMP: NORMAL
SERVICE CMNT-IMP: NORMAL
SPECIMEN DESCRIPTION: NORMAL
SPECIMEN DESCRIPTION: NORMAL

## 2025-02-03 LAB
MICROORGANISM SPEC CULT: NORMAL
SERVICE CMNT-IMP: NORMAL
SPECIMEN DESCRIPTION: NORMAL

## 2025-02-04 LAB
MICROORGANISM SPEC CULT: NORMAL
SERVICE CMNT-IMP: NORMAL
SPECIMEN DESCRIPTION: NORMAL

## 2025-05-15 RX ORDER — PRIMIDONE 50 MG/1
50 TABLET ORAL 2 TIMES DAILY
Qty: 180 TABLET | Refills: 3 | OUTPATIENT
Start: 2025-05-15

## 2025-05-15 NOTE — TELEPHONE ENCOUNTER
Last seen Visit date not found  Next appt Visit date not found    Requested Prescriptions     Pending Prescriptions Disp Refills    primidone (MYSOLINE) 50 MG tablet 180 tablet 3     Sig: Take 1 tablet by mouth in the morning and at bedtime

## 2025-05-22 RX ORDER — PRIMIDONE 50 MG/1
50 TABLET ORAL 2 TIMES DAILY
Qty: 60 TABLET | Refills: 2 | Status: SHIPPED | OUTPATIENT
Start: 2025-05-22

## 2025-05-22 NOTE — TELEPHONE ENCOUNTER
Patients wife scheduled a follow up for Chandra in July. She is asking if you can refill medication until then.

## 2025-05-22 NOTE — TELEPHONE ENCOUNTER
Last seen Visit date not found  Next appt 7/30/2025    Requested Prescriptions     Pending Prescriptions Disp Refills    primidone (MYSOLINE) 50 MG tablet 60 tablet 0

## 2025-07-29 NOTE — PROGRESS NOTES
PAULO   III,VII: ptosis None   III,IV,VI: extraocular muscles  EOMI without nystagmus    V: mastication Normal   V: facial light touch sensation  Normal   V,VII: corneal reflex     VII: facial muscle function - upper     VII: facial muscle function - lower Normal   VIII: hearing Normal   IX: soft palate elevation     IX,X: gag reflex    XI: trapezius strength  5/5   XI: sternocleidomastoid strength 5/5   XI: neck extension strength  5/5   XII: tongue strength  Normal     Motor:  5/5 throughout  Normal bulk and tone   Minimal tremor with outstretched hand  No ataxic tremor  No resting tremor  No cogwheel rigidity  No bradykinesia    Sensory:  LT normal  Vibration normal     Coordination:   FN symmetrical without ataxia    Gait:  Normal     DTR:   Barely elicited throughout    No Pollack's     Laboratory/Radiology:     Labs completed at [facility] which I cannot personally review at this time      CT Head 2022  No acute intracranial abnormality.  MRI may be obtained if clinically  indicated.    I independently reviewed his imaging studies today.     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

## 2025-07-30 ENCOUNTER — OFFICE VISIT (OUTPATIENT)
Age: 65
End: 2025-07-30
Payer: MEDICARE

## 2025-07-30 VITALS
HEART RATE: 70 BPM | DIASTOLIC BLOOD PRESSURE: 74 MMHG | WEIGHT: 180 LBS | HEIGHT: 71 IN | RESPIRATION RATE: 18 BRPM | SYSTOLIC BLOOD PRESSURE: 124 MMHG | OXYGEN SATURATION: 95 % | BODY MASS INDEX: 25.2 KG/M2

## 2025-07-30 DIAGNOSIS — R56.9 SEIZURE (HCC): ICD-10-CM

## 2025-07-30 DIAGNOSIS — G25.0 FAMILIAL TREMOR: Primary | ICD-10-CM

## 2025-07-30 PROCEDURE — 99214 OFFICE O/P EST MOD 30 MIN: CPT | Performed by: CLINICAL NURSE SPECIALIST

## 2025-07-30 PROCEDURE — 1123F ACP DISCUSS/DSCN MKR DOCD: CPT | Performed by: CLINICAL NURSE SPECIALIST

## 2025-07-30 PROCEDURE — 3017F COLORECTAL CA SCREEN DOC REV: CPT | Performed by: CLINICAL NURSE SPECIALIST

## 2025-07-30 PROCEDURE — G8427 DOCREV CUR MEDS BY ELIG CLIN: HCPCS | Performed by: CLINICAL NURSE SPECIALIST

## 2025-07-30 PROCEDURE — 1036F TOBACCO NON-USER: CPT | Performed by: CLINICAL NURSE SPECIALIST

## 2025-07-30 PROCEDURE — 99215 OFFICE O/P EST HI 40 MIN: CPT | Performed by: CLINICAL NURSE SPECIALIST

## 2025-07-30 PROCEDURE — G8419 CALC BMI OUT NRM PARAM NOF/U: HCPCS | Performed by: CLINICAL NURSE SPECIALIST

## 2025-07-30 RX ORDER — EMPAGLIFLOZIN 10 MG/1
10 TABLET, FILM COATED ORAL DAILY
COMMUNITY

## 2025-07-30 RX ORDER — OMEPRAZOLE 20 MG/1
20 CAPSULE, DELAYED RELEASE ORAL DAILY
COMMUNITY
Start: 2025-07-02

## 2025-08-06 ENCOUNTER — HOSPITAL ENCOUNTER (OUTPATIENT)
Dept: LAB | Age: 65
Discharge: HOME | End: 2025-08-06
Payer: MEDICARE

## 2025-08-06 ENCOUNTER — HOSPITAL ENCOUNTER (OUTPATIENT)
Dept: NEUROLOGY | Age: 65
Discharge: HOME | End: 2025-08-06
Payer: MEDICARE

## 2025-08-06 DIAGNOSIS — R56.9 SEIZURE (HCC): ICD-10-CM

## 2025-08-06 PROCEDURE — 95816 EEG AWAKE AND DROWSY: CPT | Performed by: PSYCHIATRY & NEUROLOGY

## 2025-08-06 PROCEDURE — 36415 COLL VENOUS BLD VENIPUNCTURE: CPT

## 2025-08-06 PROCEDURE — 80177 DRUG SCRN QUAN LEVETIRACETAM: CPT

## 2025-08-06 PROCEDURE — 95816 EEG AWAKE AND DROWSY: CPT

## 2025-08-08 LAB — LEVETIRACETAM SERPL-MCNC: 19 UG/ML

## 2025-08-11 ENCOUNTER — TELEPHONE (OUTPATIENT)
Dept: NEUROLOGY | Age: 65
End: 2025-08-11

## 2025-08-11 RX ORDER — PRIMIDONE 50 MG/1
50 TABLET ORAL 2 TIMES DAILY
Qty: 60 TABLET | Refills: 0 | Status: SHIPPED | OUTPATIENT
Start: 2025-08-11

## 2025-08-27 ENCOUNTER — APPOINTMENT (OUTPATIENT)
Dept: GENERAL RADIOLOGY | Age: 65
End: 2025-08-27
Payer: MEDICARE

## 2025-08-27 ENCOUNTER — HOSPITAL ENCOUNTER (OUTPATIENT)
Age: 65
Setting detail: OBSERVATION
Discharge: HOME | End: 2025-08-29
Admitting: INTERNAL MEDICINE
Payer: MEDICARE

## 2025-08-27 DIAGNOSIS — A41.9 SEPSIS, DUE TO UNSPECIFIED ORGANISM, UNSPECIFIED WHETHER ACUTE ORGAN DYSFUNCTION PRESENT (HCC): Primary | ICD-10-CM

## 2025-08-27 LAB
ALBUMIN SERPL-MCNC: 3.8 G/DL (ref 3.5–5.2)
ALP SERPL-CCNC: 61 U/L (ref 40–129)
ALT SERPL-CCNC: 25 U/L (ref 0–50)
ANION GAP SERPL CALCULATED.3IONS-SCNC: 12 MMOL/L (ref 7–16)
AST SERPL-CCNC: 26 U/L (ref 0–50)
ATYPICAL LYMPHOCYTE ABSOLUTE COUNT: 0.22 K/UL (ref 0–0.46)
ATYPICAL LYMPHOCYTES: 2 % (ref 0–4)
BASOPHILS # BLD: 0 K/UL (ref 0–0.2)
BASOPHILS NFR BLD: 0 % (ref 0–2)
BILIRUB SERPL-MCNC: 0.5 MG/DL (ref 0–1.2)
BILIRUB UR QL STRIP: NEGATIVE
BUN SERPL-MCNC: 20 MG/DL (ref 8–23)
CALCIUM SERPL-MCNC: 8.2 MG/DL (ref 8.8–10.2)
CHLORIDE SERPL-SCNC: 105 MMOL/L (ref 98–107)
CLARITY UR: CLEAR
CO2 SERPL-SCNC: 20 MMOL/L (ref 22–29)
COLOR UR: YELLOW
CREAT SERPL-MCNC: 1.1 MG/DL (ref 0.7–1.2)
EOSINOPHIL # BLD: 0 K/UL (ref 0.05–0.5)
EOSINOPHILS RELATIVE PERCENT: 0 % (ref 0–6)
ERYTHROCYTE [DISTWIDTH] IN BLOOD BY AUTOMATED COUNT: 13.3 % (ref 11.5–15)
GFR, ESTIMATED: 71 ML/MIN/1.73M2
GLUCOSE SERPL-MCNC: 171 MG/DL (ref 74–99)
GLUCOSE UR STRIP-MCNC: 100 MG/DL
HCT VFR BLD AUTO: 39.5 % (ref 37–54)
HGB BLD-MCNC: 13.8 G/DL (ref 12.5–16.5)
HGB UR QL STRIP.AUTO: NEGATIVE
KETONES UR STRIP-MCNC: NEGATIVE MG/DL
LACTATE BLDV-SCNC: 1.9 MMOL/L (ref 0.5–1.9)
LACTATE BLDV-SCNC: 2.6 MMOL/L (ref 0.5–1.9)
LEUKOCYTE ESTERASE UR QL STRIP: NEGATIVE
LYMPHOCYTES NFR BLD: 0.45 K/UL (ref 1.5–4)
LYMPHOCYTES RELATIVE PERCENT: 4 % (ref 20–42)
MCH RBC QN AUTO: 29.2 PG (ref 26–35)
MCHC RBC AUTO-ENTMCNC: 34.9 G/DL (ref 32–34.5)
MCV RBC AUTO: 83.7 FL (ref 80–99.9)
MONOCYTES NFR BLD: 0.67 K/UL (ref 0.1–0.95)
MONOCYTES NFR BLD: 5 % (ref 2–12)
NEUTROPHILS NFR BLD: 90 % (ref 43–80)
NEUTS SEG NFR BLD: 11.55 K/UL (ref 1.8–7.3)
NITRITE UR QL STRIP: NEGATIVE
PH UR STRIP: 6 [PH] (ref 5–8)
PLATELET # BLD AUTO: 245 K/UL (ref 130–450)
PMV BLD AUTO: 8.7 FL (ref 7–12)
POTASSIUM SERPL-SCNC: 4 MMOL/L (ref 3.5–5.1)
PROT SERPL-MCNC: 6.2 G/DL (ref 6.4–8.3)
PROT UR STRIP-MCNC: NEGATIVE MG/DL
RBC # BLD AUTO: 4.72 M/UL (ref 3.8–5.8)
RBC # BLD: NORMAL 10*6/UL
RBC #/AREA URNS HPF: ABNORMAL /HPF
SODIUM SERPL-SCNC: 137 MMOL/L (ref 136–145)
SP GR UR STRIP: <1.005 (ref 1–1.03)
UROBILINOGEN UR STRIP-ACNC: 0.2 EU/DL (ref 0–1)
WBC #/AREA URNS HPF: ABNORMAL /HPF
WBC OTHER # BLD: 12.9 K/UL (ref 4.5–11.5)

## 2025-08-27 PROCEDURE — 2580000003 HC RX 258

## 2025-08-27 PROCEDURE — 87086 URINE CULTURE/COLONY COUNT: CPT

## 2025-08-27 PROCEDURE — 96375 TX/PRO/DX INJ NEW DRUG ADDON: CPT

## 2025-08-27 PROCEDURE — 80053 COMPREHEN METABOLIC PANEL: CPT

## 2025-08-27 PROCEDURE — 96361 HYDRATE IV INFUSION ADD-ON: CPT

## 2025-08-27 PROCEDURE — 81001 URINALYSIS AUTO W/SCOPE: CPT

## 2025-08-27 PROCEDURE — 71045 X-RAY EXAM CHEST 1 VIEW: CPT

## 2025-08-27 PROCEDURE — 83605 ASSAY OF LACTIC ACID: CPT

## 2025-08-27 PROCEDURE — 85025 COMPLETE CBC W/AUTO DIFF WBC: CPT

## 2025-08-27 PROCEDURE — 96365 THER/PROPH/DIAG IV INF INIT: CPT

## 2025-08-27 PROCEDURE — 2500000003 HC RX 250 WO HCPCS

## 2025-08-27 PROCEDURE — 6370000000 HC RX 637 (ALT 250 FOR IP)

## 2025-08-27 PROCEDURE — 6360000002 HC RX W HCPCS

## 2025-08-27 PROCEDURE — 99285 EMERGENCY DEPT VISIT HI MDM: CPT

## 2025-08-27 PROCEDURE — 87040 BLOOD CULTURE FOR BACTERIA: CPT

## 2025-08-27 PROCEDURE — G0378 HOSPITAL OBSERVATION PER HR: HCPCS

## 2025-08-27 PROCEDURE — 93005 ELECTROCARDIOGRAM TRACING: CPT

## 2025-08-27 RX ORDER — ACETAMINOPHEN 325 MG/1
650 TABLET ORAL ONCE
Status: COMPLETED | OUTPATIENT
Start: 2025-08-27 | End: 2025-08-27

## 2025-08-27 RX ORDER — 0.9 % SODIUM CHLORIDE 0.9 %
30 INTRAVENOUS SOLUTION INTRAVENOUS ONCE
Status: COMPLETED | OUTPATIENT
Start: 2025-08-27 | End: 2025-08-27

## 2025-08-27 RX ADMIN — SODIUM CHLORIDE 2421 ML: 0.9 INJECTION, SOLUTION INTRAVENOUS at 20:48

## 2025-08-27 RX ADMIN — ACETAMINOPHEN 650 MG: 325 TABLET ORAL at 20:53

## 2025-08-27 RX ADMIN — VANCOMYCIN HYDROCHLORIDE 1500 MG: 1.5 INJECTION, POWDER, LYOPHILIZED, FOR SOLUTION INTRAVENOUS at 23:06

## 2025-08-27 RX ADMIN — WATER 2000 MG: 1 INJECTION INTRAMUSCULAR; INTRAVENOUS; SUBCUTANEOUS at 22:56

## 2025-08-27 ASSESSMENT — LIFESTYLE VARIABLES
HOW MANY STANDARD DRINKS CONTAINING ALCOHOL DO YOU HAVE ON A TYPICAL DAY: PATIENT DOES NOT DRINK
HOW OFTEN DO YOU HAVE A DRINK CONTAINING ALCOHOL: NEVER

## 2025-08-28 ENCOUNTER — APPOINTMENT (OUTPATIENT)
Dept: CT IMAGING | Age: 65
End: 2025-08-28
Attending: INTERNAL MEDICINE
Payer: MEDICARE

## 2025-08-28 LAB
B PARAP IS1001 DNA NPH QL NAA+NON-PROBE: NOT DETECTED
B PERT DNA SPEC QL NAA+PROBE: NOT DETECTED
C PNEUM DNA NPH QL NAA+NON-PROBE: NOT DETECTED
EKG ATRIAL RATE: 95 BPM
EKG P AXIS: 63 DEGREES
EKG P-R INTERVAL: 160 MS
EKG Q-T INTERVAL: 306 MS
EKG QRS DURATION: 78 MS
EKG QTC CALCULATION (BAZETT): 384 MS
EKG R AXIS: 18 DEGREES
EKG T AXIS: 80 DEGREES
EKG VENTRICULAR RATE: 95 BPM
FLUAV RNA NPH QL NAA+NON-PROBE: NOT DETECTED
FLUBV RNA NPH QL NAA+NON-PROBE: NOT DETECTED
HADV DNA NPH QL NAA+NON-PROBE: NOT DETECTED
HCOV 229E RNA NPH QL NAA+NON-PROBE: NOT DETECTED
HCOV HKU1 RNA NPH QL NAA+NON-PROBE: NOT DETECTED
HCOV NL63 RNA NPH QL NAA+NON-PROBE: NOT DETECTED
HCOV OC43 RNA NPH QL NAA+NON-PROBE: NOT DETECTED
HMPV RNA NPH QL NAA+NON-PROBE: NOT DETECTED
HPIV1 RNA NPH QL NAA+NON-PROBE: NOT DETECTED
HPIV2 RNA NPH QL NAA+NON-PROBE: NOT DETECTED
HPIV3 RNA NPH QL NAA+NON-PROBE: NOT DETECTED
HPIV4 RNA NPH QL NAA+NON-PROBE: NOT DETECTED
M PNEUMO DNA NPH QL NAA+NON-PROBE: NOT DETECTED
MICROORGANISM SPEC CULT: NO GROWTH
PROCALCITONIN SERPL-MCNC: 1.98 NG/ML (ref 0–0.08)
RSV RNA NPH QL NAA+NON-PROBE: NOT DETECTED
RV+EV RNA NPH QL NAA+NON-PROBE: NOT DETECTED
SARS-COV-2 RNA NPH QL NAA+NON-PROBE: NOT DETECTED
SERVICE CMNT-IMP: NORMAL
SPECIMEN DESCRIPTION: NORMAL
SPECIMEN DESCRIPTION: NORMAL

## 2025-08-28 PROCEDURE — G0378 HOSPITAL OBSERVATION PER HR: HCPCS

## 2025-08-28 PROCEDURE — 96376 TX/PRO/DX INJ SAME DRUG ADON: CPT

## 2025-08-28 PROCEDURE — 96375 TX/PRO/DX INJ NEW DRUG ADDON: CPT

## 2025-08-28 PROCEDURE — 6360000002 HC RX W HCPCS: Performed by: INTERNAL MEDICINE

## 2025-08-28 PROCEDURE — 6370000000 HC RX 637 (ALT 250 FOR IP): Performed by: INTERNAL MEDICINE

## 2025-08-28 PROCEDURE — 2500000003 HC RX 250 WO HCPCS: Performed by: INTERNAL MEDICINE

## 2025-08-28 PROCEDURE — 36415 COLL VENOUS BLD VENIPUNCTURE: CPT

## 2025-08-28 PROCEDURE — 71250 CT THORAX DX C-: CPT

## 2025-08-28 PROCEDURE — 96366 THER/PROPH/DIAG IV INF ADDON: CPT

## 2025-08-28 PROCEDURE — 93010 ELECTROCARDIOGRAM REPORT: CPT | Performed by: INTERNAL MEDICINE

## 2025-08-28 PROCEDURE — 84145 PROCALCITONIN (PCT): CPT

## 2025-08-28 PROCEDURE — 0202U NFCT DS 22 TRGT SARS-COV-2: CPT

## 2025-08-28 RX ORDER — PANTOPRAZOLE SODIUM 40 MG/1
40 TABLET, DELAYED RELEASE ORAL
Status: DISCONTINUED | OUTPATIENT
Start: 2025-08-28 | End: 2025-08-29 | Stop reason: HOSPADM

## 2025-08-28 RX ORDER — CITALOPRAM HYDROBROMIDE 20 MG/1
40 TABLET ORAL DAILY
Status: DISCONTINUED | OUTPATIENT
Start: 2025-08-28 | End: 2025-08-29 | Stop reason: HOSPADM

## 2025-08-28 RX ORDER — LEVETIRACETAM 500 MG/1
1000 TABLET ORAL 2 TIMES DAILY
Status: DISCONTINUED | OUTPATIENT
Start: 2025-08-28 | End: 2025-08-29 | Stop reason: HOSPADM

## 2025-08-28 RX ORDER — ASPIRIN 81 MG/1
81 TABLET ORAL DAILY
Status: DISCONTINUED | OUTPATIENT
Start: 2025-08-28 | End: 2025-08-29 | Stop reason: HOSPADM

## 2025-08-28 RX ORDER — METOPROLOL SUCCINATE 25 MG/1
25 TABLET, EXTENDED RELEASE ORAL DAILY
Status: DISCONTINUED | OUTPATIENT
Start: 2025-08-28 | End: 2025-08-29 | Stop reason: HOSPADM

## 2025-08-28 RX ORDER — ALLOPURINOL 100 MG/1
100 TABLET ORAL DAILY
Status: DISCONTINUED | OUTPATIENT
Start: 2025-08-28 | End: 2025-08-29 | Stop reason: HOSPADM

## 2025-08-28 RX ORDER — PRIMIDONE 50 MG/1
50 TABLET ORAL 2 TIMES DAILY
Status: DISCONTINUED | OUTPATIENT
Start: 2025-08-28 | End: 2025-08-29 | Stop reason: HOSPADM

## 2025-08-28 RX ORDER — ATORVASTATIN CALCIUM 80 MG/1
80 TABLET, FILM COATED ORAL DAILY
Status: DISCONTINUED | OUTPATIENT
Start: 2025-08-28 | End: 2025-08-29 | Stop reason: HOSPADM

## 2025-08-28 RX ADMIN — PRIMIDONE 50 MG: 50 TABLET ORAL at 21:07

## 2025-08-28 RX ADMIN — PANTOPRAZOLE SODIUM 40 MG: 40 TABLET, DELAYED RELEASE ORAL at 09:30

## 2025-08-28 RX ADMIN — LEVETIRACETAM 1000 MG: 500 TABLET, FILM COATED ORAL at 09:25

## 2025-08-28 RX ADMIN — ATORVASTATIN CALCIUM 80 MG: 80 TABLET, FILM COATED ORAL at 09:28

## 2025-08-28 RX ADMIN — PRIMIDONE 50 MG: 50 TABLET ORAL at 09:53

## 2025-08-28 RX ADMIN — WATER 2000 MG: 1 INJECTION INTRAMUSCULAR; INTRAVENOUS; SUBCUTANEOUS at 09:28

## 2025-08-28 RX ADMIN — ALLOPURINOL 100 MG: 100 TABLET ORAL at 09:28

## 2025-08-28 RX ADMIN — ASPIRIN 81 MG: 81 TABLET, COATED ORAL at 09:28

## 2025-08-28 RX ADMIN — METOPROLOL SUCCINATE 25 MG: 25 TABLET, EXTENDED RELEASE ORAL at 09:28

## 2025-08-28 RX ADMIN — LEVETIRACETAM 1000 MG: 500 TABLET, FILM COATED ORAL at 21:07

## 2025-08-28 RX ADMIN — WATER 1000 MG: 1 INJECTION INTRAMUSCULAR; INTRAVENOUS; SUBCUTANEOUS at 16:04

## 2025-08-28 RX ADMIN — CITALOPRAM HYDROBROMIDE 40 MG: 20 TABLET ORAL at 09:28

## 2025-08-28 ASSESSMENT — PAIN SCALES - GENERAL
PAINLEVEL_OUTOF10: 0

## 2025-08-29 VITALS
WEIGHT: 191.14 LBS | TEMPERATURE: 98 F | OXYGEN SATURATION: 91 % | HEART RATE: 63 BPM | HEIGHT: 71 IN | BODY MASS INDEX: 26.76 KG/M2 | SYSTOLIC BLOOD PRESSURE: 117 MMHG | RESPIRATION RATE: 18 BRPM | DIASTOLIC BLOOD PRESSURE: 65 MMHG

## 2025-08-29 LAB
ANION GAP SERPL CALCULATED.3IONS-SCNC: 11 MMOL/L (ref 7–16)
BUN SERPL-MCNC: 17 MG/DL (ref 8–23)
CALCIUM SERPL-MCNC: 8.6 MG/DL (ref 8.8–10.2)
CHLORIDE SERPL-SCNC: 108 MMOL/L (ref 98–107)
CO2 SERPL-SCNC: 21 MMOL/L (ref 22–29)
CREAT SERPL-MCNC: 1.1 MG/DL (ref 0.7–1.2)
ERYTHROCYTE [DISTWIDTH] IN BLOOD BY AUTOMATED COUNT: 13.5 % (ref 11.5–15)
GFR, ESTIMATED: 72 ML/MIN/1.73M2
GLUCOSE SERPL-MCNC: 145 MG/DL (ref 74–99)
HCT VFR BLD AUTO: 40.2 % (ref 37–54)
HGB BLD-MCNC: 13.5 G/DL (ref 12.5–16.5)
MCH RBC QN AUTO: 28.9 PG (ref 26–35)
MCHC RBC AUTO-ENTMCNC: 33.6 G/DL (ref 32–34.5)
MCV RBC AUTO: 86.1 FL (ref 80–99.9)
PLATELET # BLD AUTO: 237 K/UL (ref 130–450)
PMV BLD AUTO: 9 FL (ref 7–12)
POTASSIUM SERPL-SCNC: 3.9 MMOL/L (ref 3.5–5.1)
RBC # BLD AUTO: 4.67 M/UL (ref 3.8–5.8)
SODIUM SERPL-SCNC: 140 MMOL/L (ref 136–145)
WBC OTHER # BLD: 10.5 K/UL (ref 4.5–11.5)

## 2025-08-29 PROCEDURE — 6360000002 HC RX W HCPCS: Performed by: INTERNAL MEDICINE

## 2025-08-29 PROCEDURE — 2500000003 HC RX 250 WO HCPCS: Performed by: INTERNAL MEDICINE

## 2025-08-29 PROCEDURE — 80048 BASIC METABOLIC PNL TOTAL CA: CPT

## 2025-08-29 PROCEDURE — 36415 COLL VENOUS BLD VENIPUNCTURE: CPT

## 2025-08-29 PROCEDURE — 96376 TX/PRO/DX INJ SAME DRUG ADON: CPT

## 2025-08-29 PROCEDURE — 85027 COMPLETE CBC AUTOMATED: CPT

## 2025-08-29 PROCEDURE — 6370000000 HC RX 637 (ALT 250 FOR IP): Performed by: INTERNAL MEDICINE

## 2025-08-29 PROCEDURE — G0378 HOSPITAL OBSERVATION PER HR: HCPCS

## 2025-08-29 RX ORDER — LEVOFLOXACIN 500 MG/1
750 TABLET, FILM COATED ORAL EVERY 24 HOURS
Status: DISCONTINUED | OUTPATIENT
Start: 2025-08-29 | End: 2025-08-29 | Stop reason: HOSPADM

## 2025-08-29 RX ORDER — LEVOFLOXACIN 750 MG/1
750 TABLET, FILM COATED ORAL DAILY
Qty: 3 TABLET | Refills: 0 | Status: SHIPPED | OUTPATIENT
Start: 2025-08-29 | End: 2025-09-01

## 2025-08-29 RX ADMIN — ALLOPURINOL 100 MG: 100 TABLET ORAL at 09:36

## 2025-08-29 RX ADMIN — ATORVASTATIN CALCIUM 80 MG: 80 TABLET, FILM COATED ORAL at 09:36

## 2025-08-29 RX ADMIN — ASPIRIN 81 MG: 81 TABLET, COATED ORAL at 09:35

## 2025-08-29 RX ADMIN — PANTOPRAZOLE SODIUM 40 MG: 40 TABLET, DELAYED RELEASE ORAL at 05:12

## 2025-08-29 RX ADMIN — CITALOPRAM HYDROBROMIDE 40 MG: 20 TABLET ORAL at 09:35

## 2025-08-29 RX ADMIN — METOPROLOL SUCCINATE 25 MG: 25 TABLET, EXTENDED RELEASE ORAL at 09:35

## 2025-08-29 RX ADMIN — PRIMIDONE 50 MG: 50 TABLET ORAL at 09:36

## 2025-08-29 RX ADMIN — WATER 1000 MG: 1 INJECTION INTRAMUSCULAR; INTRAVENOUS; SUBCUTANEOUS at 16:18

## 2025-08-29 RX ADMIN — LEVETIRACETAM 1000 MG: 500 TABLET, FILM COATED ORAL at 09:35

## 2025-08-29 RX ADMIN — EMPAGLIFLOZIN 10 MG: 10 TABLET, FILM COATED ORAL at 09:36

## 2025-08-29 ASSESSMENT — PAIN SCALES - GENERAL: PAINLEVEL_OUTOF10: 0
